# Patient Record
(demographics unavailable — no encounter records)

---

## 2025-03-25 NOTE — PHYSICAL EXAM
[TextBox_92] : PENIS: Straight protuberant penis.  Meatus ample size in orthotopic position.   SCROTUM: Solitary testis in dependent position without palpable mass, hernia, hydrocele or varicocele

## 2025-03-25 NOTE — HISTORY OF PRESENT ILLNESS
[TextBox_4] : TAMMY presents today for an evaluation.  He was seen by me at my previous practice as an infant for a complaint of an undescended testicle.  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  Returns today to discuss the possibility of a testicular prosthesis.

## 2025-03-25 NOTE — CONSULT LETTER
[FreeTextEntry1] : Dear Dr. TAYA BOBBY ,  I had the pleasure of consulting on TAMMY GRULLON today.  Below is my note regarding the office visit today.  Thank you so very much for allowing me to participate in TAMMY's  care.  Please don't hesitate to call me should any questions or issues arise .  Sincerely,   Reed Irby MD, FACS, PU Chief, Pediatric Urology Professor of Urology and Pediatrics [employer] [employer]  President, [employer] - [place] Section Past-President, Societies for Pediatric U[employer]

## 2025-03-25 NOTE — CONSULT LETTER
[FreeTextEntry1] : Dear Dr. TAYA BOBBY ,  I had the pleasure of consulting on TAMMY GRULLON today.  Below is my note regarding the office visit today.  Thank you so very much for allowing me to participate in TAMMY's  care.  Please don't hesitate to call me should any questions or issues arise .  Sincerely,   Reed Irby MD, FACS, PU Chief, Pediatric Urology Professor of Urology and Pediatrics [facility] [employer]  President, [employer] - [place] Section Past-President, Societies for Pediatric Urology

## 2025-06-24 NOTE — CONSULT LETTER
[FreeTextEntry1] : Dear Dr. TAYA BOBBY,      I had the pleasure of seeing TAMMY GRULLON for follow up today.  Below is my note regarding the office visit today.      Thank you so very much for allowing me to participate in TAMMY's care.  Please don't hesitate to call me should any questions or issues arise.         Sincerely,     Reed Irby MD, FACS, [facility]    Chief, Pediatric Urology     Professor of Urology and Pediatrics     [facility] [employer]         President, [employer] Association - [place] Section    Past-President, Societies for Pediatric Urology

## 2025-06-24 NOTE — PHYSICAL EXAM
[TextBox_92] : Left subinguinal incision healing well without redness or discharge or warmth Prosthesis in good position and vertically oriented and dependent in the scrotum

## 2025-06-24 NOTE — CONSULT LETTER
[FreeTextEntry1] : Dear Dr. TAYA BOBBY,      I had the pleasure of seeing TAMMY GRULLON for follow up today.  Below is my note regarding the office visit today.      Thank you so very much for allowing me to participate in TAMMY's care.  Please don't hesitate to call me should any questions or issues arise.         Sincerely,     Reed Irby MD, FACS, [facility]    Chief, Pediatric Urology     Professor of Urology and Pediatrics     [facility] [employer]         President, [employer] - [place] Section    Past-President, Societies [employer] Pediatric U[employer]

## 2025-06-24 NOTE — HISTORY OF PRESENT ILLNESS
[TextBox_4] : TAMMY presents today for a postoperative visit. He was seen by me at my previous practice as an infant for a complaint of an undescended testicle. 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 Status-post placement of left testicular prosthesis 5/30/25. He is doing well postoperatively with no reported issues. No fever.  No issues reported with the wound or he position of the prosthesis

## 2025-06-24 NOTE — CONSULT LETTER
[FreeTextEntry1] : Dear Dr. TAYA BOBBY,      I had the pleasure of seeing TAMMY GRULLON for follow up today.  Below is my note regarding the office visit today.      Thank you so very much for allowing me to participate in TAMMY's care.  Please don't hesitate to call me should any questions or issues arise.         Sincerely,     Reed Irby MD, FACS, [facility]    Chief, Pediatric Urology     Professor of Urology and Pediatrics     [facility] [employer]         President, [employer] Association - [place] Section    Past-President, Societies [employer] Pediatric U[employer]

## 2025-06-24 NOTE — ASSESSMENT
[FreeTextEntry1] : HARSH  has had an excellent outcome following surgery.  I and the family are quite satisfied.  I instructed Harsh to continue to pull the prosthesis inferiorly to allow for further stretching of the scrotal cremaster muscle and maintained of the good position of the prosthesis.  the family to return if any issue were to occur in the future.  All questions were answered.